# Patient Record
Sex: FEMALE | Race: WHITE | NOT HISPANIC OR LATINO | Employment: FULL TIME
[De-identification: names, ages, dates, MRNs, and addresses within clinical notes are randomized per-mention and may not be internally consistent; named-entity substitution may affect disease eponyms.]

---

## 2017-04-28 ENCOUNTER — LAB SERVICES (OUTPATIENT)
Dept: OTHER | Age: 27
End: 2017-04-28

## 2017-04-28 ENCOUNTER — CHARTING TRANS (OUTPATIENT)
Dept: OTHER | Age: 27
End: 2017-04-28

## 2017-04-28 LAB
B-HCG SERPL-ACNC: 486 M[IU]/ML (ref 0–6)
BILIRUBIN URINE: NEGATIVE
BLOOD URINE: NEGATIVE
CLARITY: CLEAR
COLOR: NORMAL
GLUCOSE QUALITATIVE U: NEGATIVE
HBV SURFACE AG SERPL QL IA: NEGATIVE
HEMATOCRIT: 38 % (ref 34–45)
HEMOGLOBIN: 13.2 G/DL (ref 11.2–15.7)
HIV1+2 AB SERPL QL IA: NEGATIVE
KETONES, URINE: NEGATIVE
LEUKOCYTE ESTERASE URINE: NEGATIVE
MEAN CORPUSCULAR HGB CONCENTRATION: 34.7 % (ref 32–36)
MEAN CORPUSCULAR HGB: 31.9 PG (ref 27–34)
MEAN CORPUSCULAR VOLUME: 91.8 FL (ref 79–95)
MEAN PLATELET VOLUME: 9.5 FL (ref 8.6–12.4)
NITRITE URINE: NEGATIVE
PH URINE: 7 (ref 5–7)
PLATELET COUNT: 333 10*3/UL (ref 150–400)
RED BLOOD CELL COUNT: 4.14 10*6/UL (ref 3.7–5.2)
RED CELL DISTRIBUTION WIDTH: 11.9 % (ref 11.3–14.8)
RUBV IGG SERPL QL IA: NORMAL [IU]/ML
SPECIFIC GRAVITY URINE: 1.01 (ref 1–1.03)
URINE PROTEIN, QUAL (DIPSTICK): NEGATIVE
UROBILINOGEN URINE: <2
WHITE BLOOD CELL COUNT: 6.3 10*3/UL (ref 4–10)

## 2017-04-29 LAB
ABO/RH(D): NORMAL
ANTIBODY SCREEN: NEGATIVE
RPR SER QL: NORMAL

## 2017-05-01 LAB
B19V IGG SER IA-ACNC: 5.7 IV
B19V IGM SER IA-ACNC: 0.1 IV

## 2017-05-26 ENCOUNTER — IMAGING SERVICES (OUTPATIENT)
Dept: OTHER | Age: 27
End: 2017-05-26

## 2017-11-08 ENCOUNTER — CHARTING TRANS (OUTPATIENT)
Dept: OTHER | Age: 27
End: 2017-11-08

## 2018-11-23 ENCOUNTER — IMAGING SERVICES (OUTPATIENT)
Dept: OTHER | Age: 28
End: 2018-11-23

## 2018-11-28 VITALS
DIASTOLIC BLOOD PRESSURE: 70 MMHG | SYSTOLIC BLOOD PRESSURE: 118 MMHG | BODY MASS INDEX: 22.36 KG/M2 | HEIGHT: 64 IN | WEIGHT: 131 LBS

## 2021-04-21 ENCOUNTER — OFFICE VISIT (OUTPATIENT)
Dept: DERMATOLOGY | Age: 31
End: 2021-04-21

## 2021-04-21 DIAGNOSIS — L91.8 INFLAMED SKIN TAG: Primary | ICD-10-CM

## 2021-04-21 PROCEDURE — 11200 RMVL SKIN TAGS UP TO&INC 15: CPT | Performed by: DERMATOLOGY

## 2022-04-07 ENCOUNTER — HOSPITAL ENCOUNTER (OUTPATIENT)
Age: 32
Discharge: HOME | End: 2022-04-07
Payer: COMMERCIAL

## 2022-04-07 VITALS
HEART RATE: 69 BPM | TEMPERATURE: 98 F | SYSTOLIC BLOOD PRESSURE: 118 MMHG | OXYGEN SATURATION: 99 % | RESPIRATION RATE: 16 BRPM | DIASTOLIC BLOOD PRESSURE: 70 MMHG

## 2022-04-07 DIAGNOSIS — J02.0 STREPTOCOCCAL SORE THROAT: Primary | ICD-10-CM

## 2022-04-07 LAB
POCT INFLUENZA A: NEGATIVE
POCT INFLUENZA B: NEGATIVE
S PYO AG THROAT QL: POSITIVE

## 2022-04-07 PROCEDURE — 99203 OFFICE O/P NEW LOW 30 MIN: CPT | Performed by: NURSE PRACTITIONER

## 2022-04-07 PROCEDURE — 87880 STREP A ASSAY W/OPTIC: CPT | Performed by: NURSE PRACTITIONER

## 2022-04-07 PROCEDURE — 87502 INFLUENZA DNA AMP PROBE: CPT | Performed by: NURSE PRACTITIONER

## 2022-04-07 RX ORDER — PENICILLIN V POTASSIUM 500 MG/1
500 TABLET ORAL 2 TIMES DAILY
Qty: 20 TABLET | Refills: 0 | Status: SHIPPED | OUTPATIENT
Start: 2022-04-07 | End: 2022-04-17

## 2022-04-07 NOTE — ED INITIAL ASSESSMENT (HPI)
Patient started with a sore throat last Thursday,but much worse Sunday and then started with a fever. Fever broke on Tuesday. She is still coughing, has a sore throat, and laryngitis. Her kids have been sick and are negative for covid.

## 2022-05-25 DIAGNOSIS — J30.9 ALLERGIC CONJUNCTIVITIS AND RHINITIS, BILATERAL: Primary | ICD-10-CM

## 2022-05-25 DIAGNOSIS — H10.13 ALLERGIC CONJUNCTIVITIS AND RHINITIS, BILATERAL: Primary | ICD-10-CM

## 2022-05-25 RX ORDER — MONTELUKAST SODIUM 10 MG/1
10 TABLET ORAL NIGHTLY
Qty: 90 TABLET | Refills: 1 | Status: SHIPPED | OUTPATIENT
Start: 2022-05-25

## 2022-05-25 RX ORDER — OLOPATADINE HYDROCHLORIDE 2 MG/ML
1 SOLUTION/ DROPS OPHTHALMIC 2 TIMES DAILY
Qty: 2.5 ML | Refills: 0 | Status: SHIPPED | OUTPATIENT
Start: 2022-05-25

## 2023-02-07 ENCOUNTER — OFFICE VISIT (OUTPATIENT)
Dept: FAMILY MEDICINE CLINIC | Facility: CLINIC | Age: 33
End: 2023-02-07
Payer: COMMERCIAL

## 2023-02-07 VITALS
RESPIRATION RATE: 18 BRPM | TEMPERATURE: 99 F | HEIGHT: 63 IN | HEART RATE: 96 BPM | WEIGHT: 125 LBS | OXYGEN SATURATION: 99 % | DIASTOLIC BLOOD PRESSURE: 76 MMHG | SYSTOLIC BLOOD PRESSURE: 120 MMHG | BODY MASS INDEX: 22.15 KG/M2

## 2023-02-07 DIAGNOSIS — J01.90 ACUTE NON-RECURRENT SINUSITIS, UNSPECIFIED LOCATION: Primary | ICD-10-CM

## 2023-02-07 PROCEDURE — 3074F SYST BP LT 130 MM HG: CPT | Performed by: PHYSICIAN ASSISTANT

## 2023-02-07 PROCEDURE — 3078F DIAST BP <80 MM HG: CPT | Performed by: PHYSICIAN ASSISTANT

## 2023-02-07 PROCEDURE — 99213 OFFICE O/P EST LOW 20 MIN: CPT | Performed by: PHYSICIAN ASSISTANT

## 2023-02-07 PROCEDURE — 3008F BODY MASS INDEX DOCD: CPT | Performed by: PHYSICIAN ASSISTANT

## 2023-02-07 RX ORDER — AMOXICILLIN AND CLAVULANATE POTASSIUM 875; 125 MG/1; MG/1
1 TABLET, FILM COATED ORAL 2 TIMES DAILY
Qty: 20 TABLET | Refills: 0 | Status: SHIPPED | OUTPATIENT
Start: 2023-02-07 | End: 2023-02-17

## 2023-02-07 RX ORDER — FLUTICASONE PROPIONATE 50 MCG
2 SPRAY, SUSPENSION (ML) NASAL DAILY
Qty: 1 EACH | Refills: 0 | Status: SHIPPED | OUTPATIENT
Start: 2023-02-07

## 2024-05-29 ENCOUNTER — HOSPITAL ENCOUNTER (OUTPATIENT)
Age: 34
Discharge: HOME | End: 2024-05-29

## 2024-05-29 VITALS
RESPIRATION RATE: 16 BRPM | HEIGHT: 62 IN | DIASTOLIC BLOOD PRESSURE: 73 MMHG | OXYGEN SATURATION: 100 % | WEIGHT: 120 LBS | BODY MASS INDEX: 22.08 KG/M2 | HEART RATE: 58 BPM | SYSTOLIC BLOOD PRESSURE: 112 MMHG | TEMPERATURE: 98 F

## 2024-05-29 DIAGNOSIS — H61.21 HEARING LOSS OF RIGHT EAR DUE TO CERUMEN IMPACTION: Primary | ICD-10-CM

## 2024-05-29 PROCEDURE — 99213 OFFICE O/P EST LOW 20 MIN: CPT | Performed by: NURSE PRACTITIONER

## 2024-05-29 RX ORDER — FLUOXETINE HYDROCHLORIDE 20 MG/1
20 CAPSULE ORAL DAILY
COMMUNITY
Start: 2024-03-18

## 2024-05-29 NOTE — ED PROVIDER NOTES
Patient Seen in: [facility]      History     Chief Complaint   Patient presents with    Ear Problem     Ear ache in my right ear - Entered by patient     Stated Complaint: Ear Problem - Ear ache in my right ear    Subjective:   35 yo female presents to the immediate care with c/o right ear pain.  Patient states for the last 2 days she has been having some mild pain to her right ear.  She states it feels like it is clogged.  She denies any fever, chills, nasal congestion, runny nose, ear drainage, or sore throat.     The history is provided by the patient.         Objective:   Past Medical History:    Acne    Migraine    SVT (supraventricular tachycardia) (HCC)              Past Surgical History:   Procedure Laterality Date    Adenoidectomy      Endovas non-cardiac abl cath      Lumpectomy right                  Social History     Socioeconomic History    Marital status:    Tobacco Use    Smoking status: Never    Smokeless tobacco: Never   Vaping Use    Vaping status: Never Used   Substance and Sexual Activity    Alcohol use: No    Sexual activity: Yes     Partners: Male     Birth control/protection: Condom   Social History Narrative    ** Merged History Encounter **          Social Determinants of Health     Food Insecurity: Low Risk  (12/6/2023)    Received from [facility]    Food Insecurity     Have there been times that your food ran out, and you didn't have money to get more?: No     Are there times that you worry that this might happen?: No   Transportation Needs: Low Risk  (12/6/2023)    Received from [facility]    Transportation Needs     Do you have trouble getting transportation to medical appointments?: No     How do you normally get to and from your appointments?: Other              Review of Systems   Constitutional: Negative.    HENT:  Positive for ear pain. Negative for congestion, ear discharge, rhinorrhea and sore throat.    All other  systems reviewed and are negative.      Positive for stated complaint: Ear Problem - Ear ache in my right ear  Other systems are as noted in HPI.  Constitutional and vital signs reviewed.      All other systems reviewed and negative except as noted above.    Physical Exam     ED Triage Vitals [05/29/24 1208]   /73   Pulse 58   Resp 16   Temp 97.8 °F (36.6 °C)   Temp src Temporal   SpO2 100 %   O2 Device None (Room air)       Current Vitals:   Vital Signs  BP: 112/73  Pulse: 58  Resp: 16  Temp: 97.8 °F (36.6 °C)  Temp src: Temporal    Oxygen Therapy  SpO2: 100 %  O2 Device: None (Room air)            Physical Exam  Vitals and nursing note reviewed.   Constitutional:       General: She is not in acute distress.     Appearance: Normal appearance. She is normal weight. She is not ill-appearing.   HENT:      Head: Normocephalic and atraumatic.      Right Ear: Tympanic membrane, ear canal and external ear normal.      Left Ear: Tympanic membrane, ear canal and external ear normal.      Ears:      Comments: Small piece of cerumen noted against right TM.       Nose: Nose normal.      Mouth/Throat:      Mouth: Mucous membranes are moist.      Pharynx: Oropharynx is clear.   Eyes:      Conjunctiva/sclera: Conjunctivae normal.      Pupils: Pupils are equal, round, and reactive to light.   Cardiovascular:      Rate and Rhythm: Normal rate and regular rhythm.      Pulses: Normal pulses.      Heart sounds: Normal heart sounds.   Pulmonary:      Effort: Pulmonary effort is normal. No respiratory distress.      Breath sounds: Normal breath sounds.   Musculoskeletal:         General: Normal range of motion.   Skin:     General: Skin is warm and dry.   Neurological:      General: No focal deficit present.      Mental Status: She is alert and oriented to person, place, and time.   Psychiatric:         Mood and Affect: Mood normal.         Behavior: Behavior normal.           ED Course   Labs Reviewed - No data to display                 MDM              Medical Decision Making  33 yo female with partial cerumen impaction of the right ear.  Feel this is likely causing her discomfort as it is pressing up against the TM.  Recommended use of Debrox at home and the irrigating the ear with warm water.  No evidence of sepsis, otitis media, otitis externa, DORIAN, or mastoiditis.  ENT given for follow up  as needed.     Risk  OTC drugs.        Disposition and Plan     Clinical Impression:  1. Hearing loss of right ear due to cerumen impaction         Disposition:  Discharge  5/29/2024 12:22 pm    Follow-up:  Christiana Modi  [address] [place] [id number] [phone]      As needed    Omar George MD  [address] [facility] [address] [phone]      As needed          Medications Prescribed:  Current Discharge Medication List

## 2024-05-29 NOTE — DISCHARGE INSTRUCTIONS
Try using Debrox ear drops to the right ear a few nights in a row.   After this flush the ear with warm water.   If not improving follow up with ENT.